# Patient Record
Sex: FEMALE | Race: BLACK OR AFRICAN AMERICAN
[De-identification: names, ages, dates, MRNs, and addresses within clinical notes are randomized per-mention and may not be internally consistent; named-entity substitution may affect disease eponyms.]

---

## 2018-10-15 ENCOUNTER — HOSPITAL ENCOUNTER (OUTPATIENT)
Dept: HOSPITAL 62 - LC | Age: 38
Discharge: HOME | End: 2018-10-15
Attending: OBSTETRICS & GYNECOLOGY
Payer: COMMERCIAL

## 2018-10-15 DIAGNOSIS — Z34.93: Primary | ICD-10-CM

## 2018-10-15 PROCEDURE — 4A1HXCZ MONITORING OF PRODUCTS OF CONCEPTION, CARDIAC RATE, EXTERNAL APPROACH: ICD-10-PCS | Performed by: OBSTETRICS & GYNECOLOGY

## 2018-10-15 PROCEDURE — 59025 FETAL NON-STRESS TEST: CPT

## 2018-10-22 ENCOUNTER — HOSPITAL ENCOUNTER (OUTPATIENT)
Dept: HOSPITAL 62 - LC | Age: 38
Discharge: HOME | End: 2018-10-22
Attending: OBSTETRICS & GYNECOLOGY
Payer: COMMERCIAL

## 2018-10-22 DIAGNOSIS — O09.523: ICD-10-CM

## 2018-10-22 DIAGNOSIS — O24.419: Primary | ICD-10-CM

## 2018-10-22 DIAGNOSIS — Z3A.35: ICD-10-CM

## 2018-10-22 PROCEDURE — 4A1HXCZ MONITORING OF PRODUCTS OF CONCEPTION, CARDIAC RATE, EXTERNAL APPROACH: ICD-10-PCS | Performed by: OBSTETRICS & GYNECOLOGY

## 2018-10-22 PROCEDURE — 59025 FETAL NON-STRESS TEST: CPT

## 2018-10-22 NOTE — NON STRESS TEST REPORT
=================================================================

Non Stress Test

=================================================================

Datetime Report Generated by CPN: 10/22/2018 17:13

   

   

=================================================================

DEMOGRAPHIC

=================================================================

   

EGA NST:  35.5

   

=================================================================

INDICATION

=================================================================

   

Indication for Study:  Diabetes Mellitus; Ordered by Provider

   

=================================================================

VITAL SIGNS

=================================================================

   

Temperature - NST:  98.0

Pulse - NST:  96

RESP - NST:  16

NBPSYS NST:  116

NBPDIA NST:  65

   

=================================================================

MONITORING

=================================================================

   

Monitor Explained:  Monitor Explained; Test Explained; Patient

   Verbalized Understanding

Time on Monitor:  10/22/2018 16:43

Time off Monitor:  10/22/2018 17:09

NST Duration:  26

   

=================================================================

NST INTERVENTIONS

=================================================================

   

NST Interventions:  PO Hydration; Reposition Patient

Physician Notified NST:  Dr. Rivera on unit and viewed strip

BABY A:  [id number]

   

=================================================================

BABY A

=================================================================

   

Fetal Movement :  Present

Contraction Frequency :  0

FHR Baseline :  130

Accelerations :  15X15

Decelerations :  None

Variability :  Moderate 6-25bpm

NST Review:  Meets Criteria for Reactive NST

NST Review and Verified By :  ALETA Wilkes Results:  Reactive

   

=================================================================

NST REPORT

=================================================================

   

Report Trigger:  Send Report

## 2018-10-25 ENCOUNTER — HOSPITAL ENCOUNTER (OUTPATIENT)
Dept: HOSPITAL 62 - LC | Age: 38
Discharge: HOME | End: 2018-10-25
Attending: OBSTETRICS & GYNECOLOGY
Payer: COMMERCIAL

## 2018-10-25 DIAGNOSIS — Z34.93: Primary | ICD-10-CM

## 2018-10-25 PROCEDURE — 4A1HXCZ MONITORING OF PRODUCTS OF CONCEPTION, CARDIAC RATE, EXTERNAL APPROACH: ICD-10-PCS | Performed by: OBSTETRICS & GYNECOLOGY

## 2018-10-25 PROCEDURE — 59025 FETAL NON-STRESS TEST: CPT

## 2018-10-25 NOTE — NON STRESS TEST REPORT
=================================================================

Non Stress Test

=================================================================

Datetime Report Generated by CPN: 10/25/2018 17:13

   

   

=================================================================

DEMOGRAPHIC

=================================================================

   

EGA NST:  36.1

   

=================================================================

INDICATION

=================================================================

   

Indication for Study:  Diabetes Mellitus; Ordered by Provider

   

=================================================================

MONITORING

=================================================================

   

Monitor Explained:  Monitor Explained; Test Explained; Patient

   Verbalized Understanding

Time on Monitor:  10/25/2018 16:42

Time off Monitor:  10/25/2018 17:06

NST Duration:  24

   

=================================================================

NST INTERVENTIONS

=================================================================

   

NST Interventions:  PO Hydration; Reposition Patient

Physician Notified NST:  Dr. Younger

BABY A:  [id number]

   

=================================================================

BABY A

=================================================================

   

Fetal Movement :  Present

Contraction Frequency :  0

FHR Baseline :  130

Accelerations :  15X15

Decelerations :  None

Variability :  Moderate 6-25bpm

NST Review:  Meets Criteria for Reactive NST

NST Review and Verified By :  ALETA Wilkes Results:  Reactive

   

=================================================================

NST REPORT

=================================================================

   

Report Trigger:  Send Report

## 2018-11-13 ENCOUNTER — HOSPITAL ENCOUNTER (INPATIENT)
Dept: HOSPITAL 62 - LR | Age: 38
LOS: 4 days | Discharge: HOME | End: 2018-11-17
Attending: OBSTETRICS & GYNECOLOGY | Admitting: OBSTETRICS & GYNECOLOGY
Payer: COMMERCIAL

## 2018-11-13 DIAGNOSIS — O40.3XX0: ICD-10-CM

## 2018-11-13 DIAGNOSIS — Z3A.39: ICD-10-CM

## 2018-11-13 LAB
ADD MANUAL DIFF: NO
APPEARANCE UR: (no result)
APTT PPP: YELLOW S
BARBITURATES UR QL SCN: NEGATIVE
BASOPHILS # BLD AUTO: 0 10^3/UL (ref 0–0.2)
BASOPHILS NFR BLD AUTO: 0.3 % (ref 0–2)
BILIRUB UR QL STRIP: NEGATIVE
EOSINOPHIL # BLD AUTO: 0.1 10^3/UL (ref 0–0.6)
EOSINOPHIL NFR BLD AUTO: 1.5 % (ref 0–6)
ERYTHROCYTE [DISTWIDTH] IN BLOOD BY AUTOMATED COUNT: 14.5 % (ref 11.5–14)
GLUCOSE UR STRIP-MCNC: NEGATIVE MG/DL
HCT VFR BLD CALC: 37.1 % (ref 36–47)
HGB BLD-MCNC: 12.9 G/DL (ref 12–15.5)
KETONES UR STRIP-MCNC: 20 MG/DL
LYMPHOCYTES # BLD AUTO: 1.5 10^3/UL (ref 0.5–4.7)
LYMPHOCYTES NFR BLD AUTO: 16 % (ref 13–45)
MCH RBC QN AUTO: 27.8 PG (ref 27–33.4)
MCHC RBC AUTO-ENTMCNC: 34.9 G/DL (ref 32–36)
MCV RBC AUTO: 80 FL (ref 80–97)
METHADONE UR QL SCN: NEGATIVE
MONOCYTES # BLD AUTO: 0.6 10^3/UL (ref 0.1–1.4)
MONOCYTES NFR BLD AUTO: 7.1 % (ref 3–13)
NEUTROPHILS # BLD AUTO: 6.9 10^3/UL (ref 1.7–8.2)
NEUTS SEG NFR BLD AUTO: 75.1 % (ref 42–78)
NITRITE UR QL STRIP: NEGATIVE
PCP UR QL SCN: NEGATIVE
PH UR STRIP: 5 [PH] (ref 5–9)
PLATELET # BLD: 191 10^3/UL (ref 150–450)
PROT UR STRIP-MCNC: NEGATIVE MG/DL
RBC # BLD AUTO: 4.65 10^6/UL (ref 3.72–5.28)
SP GR UR STRIP: 1.02
TOTAL CELLS COUNTED % (AUTO): 100 %
URINE AMPHETAMINES SCREEN: NEGATIVE
URINE BENZODIAZEPINES SCREEN: NEGATIVE
URINE COCAINE SCREEN: NEGATIVE
URINE MARIJUANA (THC) SCREEN: NEGATIVE
UROBILINOGEN UR-MCNC: NEGATIVE MG/DL (ref ?–2)
WBC # BLD AUTO: 9.1 10^3/UL (ref 4–10.5)

## 2018-11-13 PROCEDURE — 86592 SYPHILIS TEST NON-TREP QUAL: CPT

## 2018-11-13 PROCEDURE — 36415 COLL VENOUS BLD VENIPUNCTURE: CPT

## 2018-11-13 PROCEDURE — 86901 BLOOD TYPING SEROLOGIC RH(D): CPT

## 2018-11-13 PROCEDURE — 81005 URINALYSIS: CPT

## 2018-11-13 PROCEDURE — 4A1HXCZ MONITORING OF PRODUCTS OF CONCEPTION, CARDIAC RATE, EXTERNAL APPROACH: ICD-10-PCS | Performed by: OBSTETRICS & GYNECOLOGY

## 2018-11-13 PROCEDURE — 88307 TISSUE EXAM BY PATHOLOGIST: CPT

## 2018-11-13 PROCEDURE — 85027 COMPLETE CBC AUTOMATED: CPT

## 2018-11-13 PROCEDURE — 99465 NB RESUSCITATION: CPT

## 2018-11-13 PROCEDURE — 80307 DRUG TEST PRSMV CHEM ANLYZR: CPT

## 2018-11-13 PROCEDURE — 86900 BLOOD TYPING SEROLOGIC ABO: CPT

## 2018-11-13 PROCEDURE — 85025 COMPLETE CBC W/AUTO DIFF WBC: CPT

## 2018-11-13 PROCEDURE — 86850 RBC ANTIBODY SCREEN: CPT

## 2018-11-13 RX ADMIN — SODIUM CHLORIDE, SODIUM LACTATE, POTASSIUM CHLORIDE, AND CALCIUM CHLORIDE PRN MLS/HR: .6; .31; .03; .02 INJECTION, SOLUTION INTRAVENOUS at 21:29

## 2018-11-13 RX ADMIN — SODIUM CHLORIDE, SODIUM LACTATE, POTASSIUM CHLORIDE, AND CALCIUM CHLORIDE PRN MLS/HR: .6; .31; .03; .02 INJECTION, SOLUTION INTRAVENOUS at 21:28

## 2018-11-14 PROCEDURE — 3E033VJ INTRODUCTION OF OTHER HORMONE INTO PERIPHERAL VEIN, PERCUTANEOUS APPROACH: ICD-10-PCS | Performed by: OBSTETRICS & GYNECOLOGY

## 2018-11-14 PROCEDURE — 3E0P7VZ INTRODUCTION OF HORMONE INTO FEMALE REPRODUCTIVE, VIA NATURAL OR ARTIFICIAL OPENING: ICD-10-PCS | Performed by: OBSTETRICS & GYNECOLOGY

## 2018-11-14 RX ADMIN — PENICILLIN G POTASSIUM SCH MLS/HR: 5000000 POWDER, FOR SOLUTION INTRAMUSCULAR; INTRAPLEURAL; INTRATHECAL; INTRAVENOUS at 19:57

## 2018-11-14 RX ADMIN — SODIUM CHLORIDE, SODIUM LACTATE, POTASSIUM CHLORIDE, AND CALCIUM CHLORIDE PRN MLS/HR: .6; .31; .03; .02 INJECTION, SOLUTION INTRAVENOUS at 20:43

## 2018-11-14 NOTE — L&D PROGRESS NOTES
=================================================================

PROGRESS NOTES

=================================================================

Datetime Report Generated by CPN: 11/14/2018 12:33

   

   

=================================================================

PROGRESS NOTE

=================================================================

   

Impression:  Reassuring Fetal Heart Rate

Procedures- Other:  Ochoa's catheter placed for cervical ripening

Plan:  Continue Present Management; Induction

Vital Signs :  Reviewed; Within Normal Limits

Comment:  IOL. Ochoa's catheter placed to continue with cervical

   ripening. IV Nubain given prior to procedure, pt tolerated well.

   Will start routine Pitocin when demands of nursing unit will allow. 

   Position changes encouraged

   

=================================================================

VAGINAL EXAM

=================================================================

   

Dilatation:  2

Effacement:  70

Station:  -1

Contractions:  q2-4

   

=================================================================

FETUS A

=================================================================

   

FHR - Baseline:  125

Monitoring:  External US

Variability:  Moderate 6-25bpm

Accelerations:  15X15

Decelerations:  None

FHR Category:  Category I

   

=================================================================

FETUS C

=================================================================

   

SIGNATURE:  [id number];[id number];[id number]

Assignment:  Elizabeth Avalos MD

Signature:  Electronically signed by Laura Reyna CNM on 11/14/2018

   at 12:33  with User ID: [email or web address]

:  Electronically signed by Laura Reyna CNM on 11/14/2018 at 12:33

   with User ID: [email or web address]

## 2018-11-14 NOTE — L&D PROGRESS NOTES
=================================================================

PROGRESS NOTES

=================================================================

Datetime Report Generated by CPN: 11/14/2018 16:34

   

   

=================================================================

PROGRESS NOTE

=================================================================

   

Impression:  Reassuring Fetal Heart Rate

Plan:  Continue Present Management; Induction

Plan Other:  Pt may eat dinner

Vital Signs :  Reviewed; Within Normal Limits

Comment:  IOL, Ochoa's catheter remains in place. Pitocin recently

   started. Pt remains comfortable.  PLan to let pt eat dinner tonight

   and continue with Ochoa's cath and Pitcoin. Position changes

   encouraged. 

   

=================================================================

VAGINAL EXAM

=================================================================

   

Contractions:  occassional

   occassional

Contractions:  abd palpates soft

   

=================================================================

MEMBRANES

=================================================================

   

Membranes:  Intact

   

=================================================================

FETUS A

=================================================================

   

FHR - Baseline:  130

Monitoring:  External US

Variability:  Moderate 6-25bpm

Accelerations:  15X15

Decelerations:  None

FHR Category:  Category I

   

=================================================================

FETUS C

=================================================================

   

SIGNATURE:  [id number];[id number];[id number]

Assignment:  Elizabeth Avalos MD

Signature:  Electronically signed by Laura Reyna CNM on 11/14/2018

   at 16:33  with User ID: [email or web address]

:  Electronically signed by Laura Reyna CNM on 11/14/2018 at 16:33

   with User ID: [email or web address]

## 2018-11-14 NOTE — L&D PROGRESS NOTES
=================================================================

PROGRESS NOTES

=================================================================

Datetime Report Generated by CPN: 2018 11:22

   

   

=================================================================

PROGRESS NOTE

=================================================================

   

Impression:  Reassuring Fetal Heart Rate

Procedures:  Sterile Vag Exam

Plan:  Induction

Plan Other:  cervical ripening

Vital Signs :  Reviewed; Within Normal Limits

Comment:   at 39 wks IOL for GDM. GBS+.  S/p cervidil overnight

   for cervical ripening.  Pt did eat breakfast this morning. No

   complaints. Plan to place a Ochoa's cath and start Routine Pitocin

   when able to (due to pt census on the unit).  Pt plans epidural when

   in labor. Attending MD is Dr Avalos

   

=================================================================

VAGINAL EXAM

=================================================================

   

Dilatation:  2

Dilatation:  0

Effacement:  70

Effacement:  0

Station:  -2

Station:  -2

Contractions:  occassional

   

=================================================================

MEMBRANES

=================================================================

   

Pooling:  Negative

Membranes:  Intact

Membranes:  Intact

   

=================================================================

FETUS A

=================================================================

   

FHR - Baseline:  140

Monitoring:  External US

Variability:  Minimal - Undetectable to <=5bpm

Accelerations:  10X10

Decelerations:  None

:  39.0

Estimated Fetal Weight (gm):  4000

Fetal Presentation:  Vertex

   

=================================================================

SIGNATURE

=================================================================

   

SIGNATURE:  [id number];[id number];[id number]

SIGNATURE:  [id number];[id number]

SIGNATURE:  [id number]

SIGNATURE:  [id number]

SIGNATURE:  [id number]

Assignment:  Elizabeth Avalos MD

Signature:  Electronically signed by Laura Reyna CNM on 2018

   at 11:22  with User ID: [email or web address]

:  Electronically signed by Laura Reyna CNM on 2018 at 11:22

   with User ID: [email or web address]

## 2018-11-14 NOTE — L&D PROGRESS NOTES
=================================================================

PROGRESS NOTES

=================================================================

Datetime Report Generated by CPN: 2018 21:33

   

   

=================================================================

PROGRESS NOTE

=================================================================

   

Impression:  Normal Progression of Labor

Procedures:  Artificial ROM; Sterile Vag Exam

Plan:  Continue Present Management; Induction

Informed Consent Obtained:  Vaginal Delivery; Induction of Labor;

   Risks, Benefits and Alternatives Discussed

Vital Signs :  Reviewed

Comment:  IOL due to GDM at 39wks.  Pt now comfortable with epidural. 

   Doing well.  cvx 5/75/-2 and AROM performed with head well applied

   to cervix.  Continue with pitocin and anticiapte .

   

=================================================================

VAGINAL EXAM

=================================================================

   

Dilatation:  5

Effacement:  75

Station:  -2

Contractions:  q 3-4

Dilitation:  5.5

Dilitation:  5.0

Effacement:  75

Effacement:  75

Station:  -2

Station:  -2

   

=================================================================

MEMBRANES

=================================================================

   

Amniotic Fluid Color:  Clear

   

=================================================================

FETUS A

=================================================================

   

FHR - Baseline:  160

Monitoring:  External US

Variability:  Moderate 6-25bpm

Accelerations:  15X15

Decelerations:  None

FHR Category:  Category II

   

=================================================================

FETUS C

=================================================================

   

SIGNATURE:  [id number];[id number];10,[id number]

Signature:  Electronically signed by Elizabeth Avalos MD ([email or web address]) on

   2018 at 21:32  with User ID: [email or web address]

## 2018-11-15 PROCEDURE — 0HQ9XZZ REPAIR PERINEUM SKIN, EXTERNAL APPROACH: ICD-10-PCS | Performed by: OBSTETRICS & GYNECOLOGY

## 2018-11-15 RX ADMIN — DOCUSATE SODIUM SCH MG: 100 CAPSULE, LIQUID FILLED ORAL at 10:52

## 2018-11-15 RX ADMIN — DOCUSATE SODIUM SCH MG: 100 CAPSULE, LIQUID FILLED ORAL at 17:21

## 2018-11-15 RX ADMIN — FERROUS SULFATE TAB 325 MG (65 MG ELEMENTAL FE) SCH MG: 325 (65 FE) TAB at 10:52

## 2018-11-15 RX ADMIN — IBUPROFEN SCH MG: 800 TABLET, FILM COATED ORAL at 13:11

## 2018-11-15 RX ADMIN — PENICILLIN G POTASSIUM SCH MLS/HR: 5000000 POWDER, FOR SOLUTION INTRAMUSCULAR; INTRAPLEURAL; INTRATHECAL; INTRAVENOUS at 00:03

## 2018-11-15 RX ADMIN — PENICILLIN G POTASSIUM SCH: 5000000 POWDER, FOR SOLUTION INTRAMUSCULAR; INTRAPLEURAL; INTRATHECAL; INTRAVENOUS at 11:18

## 2018-11-15 RX ADMIN — SODIUM CHLORIDE, SODIUM LACTATE, POTASSIUM CHLORIDE, AND CALCIUM CHLORIDE PRN MLS/HR: .6; .31; .03; .02 INJECTION, SOLUTION INTRAVENOUS at 00:06

## 2018-11-15 RX ADMIN — FAMOTIDINE SCH MG: 20 TABLET, FILM COATED ORAL at 10:52

## 2018-11-15 RX ADMIN — FAMOTIDINE SCH MG: 20 TABLET, FILM COATED ORAL at 21:44

## 2018-11-15 RX ADMIN — Medication SCH CAP: at 10:52

## 2018-11-15 RX ADMIN — SENNOSIDES, DOCUSATE SODIUM SCH EACH: 50; 8.6 TABLET, FILM COATED ORAL at 10:52

## 2018-11-15 RX ADMIN — IBUPROFEN SCH MG: 800 TABLET, FILM COATED ORAL at 21:44

## 2018-11-15 RX ADMIN — PENICILLIN G POTASSIUM SCH: 5000000 POWDER, FOR SOLUTION INTRAMUSCULAR; INTRAPLEURAL; INTRATHECAL; INTRAVENOUS at 11:19

## 2018-11-15 RX ADMIN — FERROUS SULFATE TAB 325 MG (65 MG ELEMENTAL FE) SCH MG: 325 (65 FE) TAB at 17:21

## 2018-11-15 NOTE — DELIVERY SUMMARY
=================================================================

Del Sum A-C

=================================================================

Datetime Report Generated by CPN: 11/15/2018 09:40

   

   

=================================================================

DELIVERY PERSONNEL

=================================================================

   

DELIVERY PERSONNEL:  [id number]

Delivery Doctor::  Elizabeth Avalos MD

Anesthesiologist::  Alessandro Browne MD

Labor and Delivery Nurse::  Minal Keller RN

Labor and Delivery Nurse::  Paola Cheng RN

Student Observers::  ROSEMARIE Whaley RN Student

Scrub Tech/RENU:  Janel Maldonado CNA II

Additional Personnel: :  Luna Pena RN

   

=================================================================

MATERNAL INFORMATION

=================================================================

   

Delivery Anesthesia:  Epidural

Medications After Delivery:  Pitocin Drip 20 Units/1000ml NSS; Cytotec

   1000mcg Per Rectum/Vagina

Maternal Complications:  None

Provider Comments:  VFI delivered in CHAS presentation.  Loose nuchal

   cord reduced.  Left anterior shoulder with some difficulty delivery

   due to poor maternal effort and closing of legs.  Once legs placed

   in [place] and effort restored then anterior shoulder delivered

   easily.  Head and shoulders delivered then hips and buttocks then

   delayed delivery due to maternal effort.  Anterior shoulder - left

   shoulder with some possible crepitus (NICU asked to eval).  Placenta

   delivered intact spontaneously.  FF at U after pitocin and cytotec. 

   Right labial lacration repaired in usual fashion.  Good hemostasis. 

   Reviewed delivery with mother and partner that if GDM next pregnancy

   and if baby is the same size or larger that would recommend 

   section.  Amount of amniotic fluid consistent with polyhydramnios. 

   mother and baby stable upon provider leaving the room

   

=================================================================

LABOR SUMMARY

=================================================================

   

EDC:  2018 00:00

No. Babies in Womb:  1

 Attempted:  No

Labor Anesthesia:  Epidural

   

=================================================================

LABOR INFORMATION

=================================================================

   

Reason for Induction:  Gestational Hypertension; Maternal Diabetes

Onset of Labor:  2018 21:25

Complete Dilatation:  11/15/2018 06:26

Cervical Ripening Agents:  Cervidil; Ramirez Balloon

Oxytocin:  Induction

Group B Beta Strep:  positive

Antibiotics # of Doses:  4

Antibiotics Time of Last Dose:  0400

Name of Antibiotic Given:  PCN

Steroids Given:  None

Reason Steroids Not Administered:  Not Applicable

   

=================================================================

MEMBRANES

=================================================================

   

Membranes Rupture Method:  Spontaneous

Rupture of Membranes:  2018 21:25

Length of Rupture (hr):  10.37

Amniotic Fluid Color:  Clear

Amniotic Fluid Amount:  Small

Amniotic Fluid Odor:  Normal

   

=================================================================

STAGES OF LABOR

=================================================================

   

Stage 1 hr:  9

Stage 1 min:  1

Stage 2 hr:  1

Stage 2 min:  21

Stage 3 hr:  0

Stage 3 min:  5

Total Time in Labor hr:  10

Total Time in Labor min:  27

   

=================================================================

VAGINAL DELIVERY

=================================================================

   

Episiotomy:  None

Laceration #1:  Vaginal

Laceration Extension #1:  N/A

Laceration Repair:  Yes

Laceration Repair Note:  right labial laceration repaired with good

   hemostasis

Sponge Count Correct:  Yes

Sharps Count Correct:  Yes

   

=================================================================

BABY A INFORMATION

=================================================================

   

Infant Delivery Date/Time:  11/15/2018 07:47

Method of Delivery:  Vaginal

Born in Route :  No

:  N/A

Forceps:  N/A

Vacuum Extraction:  N/A

Shoulder Dystocia :  No

   

=================================================================

PRESENTATION/POSITION BABY A

=================================================================

   

Presentation:  Cephalic

Cephalic Presentation:  Vertex

Vertex Position:  Left Occipital Anterior

Breech Presentation:  N/A

   

=================================================================

PLACENTA INFORMATION BABY A

=================================================================

   

Placenta Delivery Time :  11/15/2018 07:52

Placenta Method of Delivery:  Spontaneous

Placenta Status:  Delivered

   

=================================================================

APGAR SCORES BABY A

=================================================================

   

Heart Rate 1 min:  >100 bpm

Resp Effort 1 min:  Slow, Irregular

Reflex Irritability 1 min:  Cough or Sneeze or Pulls Away

Muscle Tone 1 min:  Some Flexion of Extremities

Color 1 min:  Body Pink, Extremities Blue

Resuscitation Effort 1 min:  Tactile Stimulation; PPV/NCPAP

APGAR SCORE 1 MIN:  7

Heart Rate 5 min:  >100 bpm

Resp Effort 5 min:  Good Cry

Reflex Irritability 5 min:  Cough or Sneeze or Pulls Away

Muscle Tone 5 min:  Active Motion

Color 5 min:  Body Pink, Extremities Blue

Resuscitation Effort 5 min:  N/A

APGAR SCORE 5 MIN:  9

   

=================================================================

INFANT INFORMATION BABY A

=================================================================

   

Gestational Age at Delivery:  39.1

Gestational Status:  Full Term- 39- 40.6 Weeks

Infant Outcome :  Liveborn

Infant Condition :  Stable

Infant Sex:  Female

   

=================================================================

IDENTIFICATION BABY A

=================================================================

   

Infant Verification Date/Time:  11/15/2018 08:08

ID Band Number:  [id number]

Mother's Name Verified:  Yes

Infant Medical Record Number:  [id number]

RN Verifying Infant:  B Baidy RN

Additional Verifying Personnel:  S Brown CNA

   

=================================================================

WEIGHT/LENGTH BABY A

=================================================================

   

Infant Birthweight (gm):  3560

Infant Weight (lb):  7

Infant Weight (oz):  14

Infant Length (in):  20.00

Infant Length (cm):  50.80

   

=================================================================

CORD INFORMATION BABY A

=================================================================

   

No. Cord Vessels:  3

Nuchal Cord :  Around Neck x1, Tight

Cord Blood Taken:  Yes-For Eval (Mom's Blood Type - or O+)

Infant Suction:  None; Mouth

   

=================================================================

ASSESSMENT BABY A

=================================================================

   

Infant Complications:  None

Physical Findings at Delivery:  Molding of the Head

Infant Respirations:  Appears Normal

Skin to Skin:  Yes

Neonatologist/ALS Called :  No

Infant Care By:  MERISSA Cheng, RN

Transferred To:  Remains with Mother

   

=================================================================

BABY B INFORMATION

=================================================================

   

 :  N/A

   

=================================================================

SIGNATURES

=================================================================

   

Signature:  Electronically signed by Elizabeth Avalos MD ([facility]) on

   11/15/2018 at 08:21  with User ID: [email or web address]

## 2018-11-16 LAB
ERYTHROCYTE [DISTWIDTH] IN BLOOD BY AUTOMATED COUNT: 14.8 % (ref 11.5–14)
HCT VFR BLD CALC: 31.9 % (ref 36–47)
HGB BLD-MCNC: 10.8 G/DL (ref 12–15.5)
MCH RBC QN AUTO: 27.3 PG (ref 27–33.4)
MCHC RBC AUTO-ENTMCNC: 33.9 G/DL (ref 32–36)
MCV RBC AUTO: 81 FL (ref 80–97)
PLATELET # BLD: 164 10^3/UL (ref 150–450)
RBC # BLD AUTO: 3.96 10^6/UL (ref 3.72–5.28)
WBC # BLD AUTO: 11.5 10^3/UL (ref 4–10.5)

## 2018-11-16 RX ADMIN — FAMOTIDINE SCH MG: 20 TABLET, FILM COATED ORAL at 10:08

## 2018-11-16 RX ADMIN — SENNOSIDES, DOCUSATE SODIUM SCH EACH: 50; 8.6 TABLET, FILM COATED ORAL at 10:08

## 2018-11-16 RX ADMIN — FERROUS SULFATE TAB 325 MG (65 MG ELEMENTAL FE) SCH MG: 325 (65 FE) TAB at 18:26

## 2018-11-16 RX ADMIN — IBUPROFEN SCH: 800 TABLET, FILM COATED ORAL at 21:51

## 2018-11-16 RX ADMIN — IBUPROFEN SCH: 800 TABLET, FILM COATED ORAL at 06:28

## 2018-11-16 RX ADMIN — IBUPROFEN SCH MG: 800 TABLET, FILM COATED ORAL at 13:20

## 2018-11-16 RX ADMIN — Medication SCH CAP: at 10:08

## 2018-11-16 RX ADMIN — FAMOTIDINE SCH: 20 TABLET, FILM COATED ORAL at 21:55

## 2018-11-16 RX ADMIN — DOCUSATE SODIUM SCH MG: 100 CAPSULE, LIQUID FILLED ORAL at 18:26

## 2018-11-16 RX ADMIN — FERROUS SULFATE TAB 325 MG (65 MG ELEMENTAL FE) SCH MG: 325 (65 FE) TAB at 10:08

## 2018-11-16 RX ADMIN — DOCUSATE SODIUM SCH MG: 100 CAPSULE, LIQUID FILLED ORAL at 10:07

## 2018-11-16 NOTE — PDOC PROGRESS REPORT
Subjective-OB


Progress Note for:: 11/16/18





Physical Exam (OB)


Vital Signs: 


 











Temp Pulse Resp BP Pulse Ox


 


 97.9 F   92   15   129/75 H  99 


 


 11/16/18 08:21  11/16/18 08:21  11/16/18 08:21  11/16/18 08:21  11/16/18 08:21








 Intake & Output











 11/15/18 11/16/18 11/17/18





 06:59 06:59 06:59


 


Intake Total 473 2150 


 


Balance 473 2150 


 


Weight  110.677 kg 














- General


General Appearance: Alert





- PIH/Pre-Eclampsia


Clonus: Negative


Headache: Absent


Epigastric Pain: No


Visual Changes: No





- Lochia


Lochia Amount: Scant < 10 ml


Lochia Color: Rubra/Red





- Abdomen


Description: Soft


Hernia Present: No


Bowel Sounds: Normoactive


Flatus Presence: Present


Fundal Description: Firm, Midline


Fundal Height: u/u - u/2





- Respiratory


Breath sounds: Clear





Objective-Diagnostic


Laboratory: 


 





 11/16/18 06:48 





 











  11/16/18





  06:48


 


WBC  11.5 H


 


RBC  3.96


 


Hgb  10.8 L D


 


Hct  31.9 L


 


MCV  81


 


MCH  27.3


 


MCHC  33.9


 


RDW  14.8 H


 


Plt Count  164














Assessment and Plan(PN)





- Assessment and Plan


(1) Gestational diabetes mellitus (GDM) controlled on oral hypoglycemic drug


Is this a current diagnosis for this admission?: Yes   





(2) Gestational hypertension


Is this a current diagnosis for this admission?: Yes   





(3) Obesity


Is this a current diagnosis for this admission?: Yes   





(4) Obstetric labial laceration, delivered, current hospitalization


Is this a current diagnosis for this admission?: Yes   





(5) Vaginal delivery


Is this a current diagnosis for this admission?: Yes   





- Time Spent with Patient


Time with patient: Less than 15 minutes





- Disposition


Anticipated Discharge: Home


Within: within 24 hours

## 2018-11-17 VITALS — DIASTOLIC BLOOD PRESSURE: 75 MMHG | SYSTOLIC BLOOD PRESSURE: 131 MMHG

## 2018-11-17 RX ADMIN — IBUPROFEN SCH MG: 800 TABLET, FILM COATED ORAL at 05:47

## 2018-11-17 NOTE — PDOC DISCHARGE SUMMARY
Final Diagnosis


Discharge Date: 11/17/18





- Final Diagnosis


(1) Gestational diabetes mellitus (GDM) controlled on oral hypoglycemic drug


Is this a current diagnosis for this admission?: Yes   





(2) Gestational hypertension


Is this a current diagnosis for this admission?: Yes   





(3) Obesity


Is this a current diagnosis for this admission?: Yes   





(4) Obstetric labial laceration, delivered, current hospitalization


Is this a current diagnosis for this admission?: Yes   





(5) Vaginal delivery


Is this a current diagnosis for this admission?: Yes   





Discharge Data





- Discharge Medication


Home Medications: 








Metformin HCl 500 mg PO DAILY 10/15/18 


Prenatal No122/Iron/Folic Acid [Prenatal Multi Tablet] 1 each PO DAILY 10/15/18 








Reason(s) for Admission: Induction of Labor, PIH, Gestional Diabetes, Group B 

Strep Positive


Prenatal Procedures: NST


Intrapartum Procedure(s): Spontaneous Vaginal Delivery


Postpartum Complication(s): Laceration-Labial


Laceration-Degree: 1st





- Diagnosis Test


Laboratory: 


 











Temp Pulse Resp BP Pulse Ox


 


 98.7 F   85   18   131/75 H  99 


 


 11/17/18 07:36  11/17/18 07:36  11/17/18 07:36  11/17/18 07:36  11/17/18 07:36








 











  11/13/18 11/13/18 11/16/18





  19:25 19:37 06:48


 


RBC   4.65  3.96


 


Hgb   12.9  10.8 L D


 


Hct   37.1  31.9 L


 


Urine Opiates Screen  NEGATIVE  














- Discharge information/Instructions


Discharge Activity: Balance Activity w/Rest, Pelvic Rest


Discharge Diet: Regular


Disposition: HOME, SELF-CARE


Follow up with: [facility]


in: 3, 4, Weeks

## 2019-04-04 ENCOUNTER — HOSPITAL ENCOUNTER (OUTPATIENT)
Dept: HOSPITAL 62 - OROUT | Age: 39
Discharge: HOME | End: 2019-04-04
Attending: OBSTETRICS & GYNECOLOGY
Payer: SELF-PAY

## 2019-04-04 VITALS — DIASTOLIC BLOOD PRESSURE: 89 MMHG | SYSTOLIC BLOOD PRESSURE: 134 MMHG

## 2019-04-04 DIAGNOSIS — Z01.818: ICD-10-CM

## 2019-04-04 DIAGNOSIS — E66.9: ICD-10-CM

## 2019-04-04 DIAGNOSIS — D06.0: Primary | ICD-10-CM

## 2019-04-04 LAB
APPEARANCE UR: (no result)
APTT PPP: YELLOW S
BILIRUB UR QL STRIP: NEGATIVE
ERYTHROCYTE [DISTWIDTH] IN BLOOD BY AUTOMATED COUNT: 14.3 % (ref 11.5–14)
GLUCOSE UR STRIP-MCNC: NEGATIVE MG/DL
HCT VFR BLD CALC: 40.1 % (ref 36–47)
HGB BLD-MCNC: 13.7 G/DL (ref 12–15.5)
KETONES UR STRIP-MCNC: NEGATIVE MG/DL
MCH RBC QN AUTO: 27 PG (ref 27–33.4)
MCHC RBC AUTO-ENTMCNC: 34.3 G/DL (ref 32–36)
MCV RBC AUTO: 79 FL (ref 80–97)
NITRITE UR QL STRIP: NEGATIVE
PH UR STRIP: 6 [PH] (ref 5–9)
PLATELET # BLD: 243 10^3/UL (ref 150–450)
PROT UR STRIP-MCNC: NEGATIVE MG/DL
RBC # BLD AUTO: 5.1 10^6/UL (ref 3.72–5.28)
SP GR UR STRIP: 1.02
UROBILINOGEN UR-MCNC: NEGATIVE MG/DL (ref ?–2)
WBC # BLD AUTO: 4.9 10^3/UL (ref 4–10.5)

## 2019-04-04 PROCEDURE — 88307 TISSUE EXAM BY PATHOLOGIST: CPT

## 2019-04-04 PROCEDURE — 57520 CONIZATION OF CERVIX: CPT

## 2019-04-04 PROCEDURE — 81001 URINALYSIS AUTO W/SCOPE: CPT

## 2019-04-04 PROCEDURE — 81025 URINE PREGNANCY TEST: CPT

## 2019-04-04 PROCEDURE — 36415 COLL VENOUS BLD VENIPUNCTURE: CPT

## 2019-04-04 PROCEDURE — S0028 INJECTION, FAMOTIDINE, 20 MG: HCPCS

## 2019-04-04 PROCEDURE — 85027 COMPLETE CBC AUTOMATED: CPT

## 2019-04-04 NOTE — OPERATIVE REPORT E
Operative Report



NAME: PAT BOBBY

MRN:  [id number]          : 1980 AGE:  39Y

DATE OF SURGERY: 2019              ROOM:



PREOPERATIVE DIAGNOSIS:

ELIANA 2 to 3 and abnormal Pap smear.



POSTOPERATIVE DIAGNOSIS:

ELIANA 2 to 3 and abnormal Pap smear.



OPERATION:

Cold knife cone.



SURGEON:

ALEXANDER ARRIOLA M.D.



ANESTHESIA:

Brianne Avendano M.D. with LMAC.



FINDINGS:

Normal-appearing cervix with no visible lesions.



COMPLICATIONS:

None.



ESTIMATED BLOOD LOSS:

10 mL.



SPECIMENS REMOVED:

Cold knife conization marked at 12 o'clock.



PROCEDURE IN DETAIL:

The patient was taken to the operating room, prepared, and draped in a

normal sterile fashion in the dorsal lithotomy position.  Under sterile

conditions in-and-out cath was performed of approximately 50 mL of clear

urine.  A sterile speculum was placed in the vagina.  The cervix was

prepped with Betadine and grasped on the anterior lip with a single-tooth

tenaculum.  Two stay sutures were placed at 3 and 9 o'clock using 0-Vicryl

on a UR6 needle and marked with hemostats.  The cervix was then injected

with approximately 2.5 mL of lidocaine in 4 different spots for a total of

10 mL.  The Oakley blade was then used to obtain the specimen starting at

the 12 o'clock position and circumferentially excising using the Oakley

blade in a normal fashion until the specimen was able to be amputated,

which was performed using Ferrell scissors.  An 11 blade was then used to

take a slightly deeper specimen, and this was removed using Allis clamps. 

This was done secondary to the patient's high risk of invasive dysplasia

or carcinoma.  The defect was then copiously cauterized using a ball

cautery until good hemostasis was obtained.  The defect was then packed

with Gelfoam soaked in Monsel's and this was tied into the cervix in place

into the defect using the 2 stay sutures which were then trimmed.  The

instruments were then removed.  Sponge, lap and needle counts were correct

x2, and the patient was taken to recovery in stable condition.



DICTATING PHYSICIAN:  ALEXANDER ARRIOLA M.D.





[id number]                  DT: 2019    1316

PHY#: [id number]            DD: 2019    1301

ID:   [id number]           JOB#: [id number]       ACCT: [id number]



cc:ALEXANDER ARRIOLA M.D.

>

## 2021-03-26 NOTE — NON STRESS TEST REPORT
=================================================================

Non Stress Test

=================================================================

Datetime Report Generated by CPN: 10/15/2018 11:49

   

   

=================================================================

DEMOGRAPHIC

=================================================================

   

EGA NST:  34.5

   

=================================================================

INDICATION

=================================================================

   

Indication for Study:  Ordered by Provider

   

=================================================================

MONITORING

=================================================================

   

Monitor Explained:  Monitor Explained; Test Explained; Patient

   Verbalized Understanding

Time on Monitor:  10/15/2018 10:45

Time off Monitor:  10/15/2018 11:35

NST Duration:  50

   

=================================================================

NST INTERVENTIONS

=================================================================

   

NST Interventions:  PO Hydration; Reposition Patient

Physician Notified NST:  CBrenda, CNM

BABY A:  [id number]

   

=================================================================

BABY A

=================================================================

   

Fetal Movement :  Present

Contraction Frequency :  None

FHR Baseline :  140

Accelerations :  15X15

Decelerations :  None

Variability :  Moderate 6-25bpm

NST Review:  Meets Criteria for Reactive NST

NST Review and Verified By :  ALETA Mcdowell Results:  Reactive

   

=================================================================

NST REPORT

=================================================================

   

Report Trigger:  Send Report No